# Patient Record
(demographics unavailable — no encounter records)

---

## 2024-10-23 NOTE — HISTORY OF PRESENT ILLNESS
[de-identified] : COUGH, MOM STATES THAT THE PATIENT'S COUGH GETS WORSE AT NIGHT. , 1 WEEK HX OF NIGHT TIME COUGH, RESPONDS TO ALBUTEROL NEBS, NO OTHERN SX

## 2025-03-05 NOTE — HISTORY OF PRESENT ILLNESS
[Father] : father [Grade ___] : Grade [unfilled] [Fruit] : fruit [Vegetables] : vegetables [Meat] : meat [Eggs] : eggs [Fish] : fish [___ stools per day] : [unfilled]  stools per day [Loose] : stools are loose consistency [Brushing teeth twice/d] : brushing teeth twice per day [Yes] : Patient goes to dentist yearly [Toothpaste] : Primary Fluoride Source: Toothpaste [Playtime (60 min/d)] : playtime 60 min a day [< 2 hrs of screen time per day] : less than 2 hrs of screen time per day [Appropiate parent-child-sibling interaction] : appropriate parent-child-sibling interaction [Has Friends] : has friends [No] : No cigarette smoke exposure [Normal] : normal [LMP: _____] : LMP: [unfilled] [Days of Bleeding: _____] : Days of bleeding: [unfilled] [Up to date] : Up to date [NO] : No [Heavy Bleeding] : no heavy bleeding [Painful Cramps] : no painful cramps [Exposure to tobacco] : no exposure to tobacco [Exposure to electronic nicotine delivery system] : No exposure to electronic nicotine delivery system [Exposure to illicit drugs] : no exposure to illicit drugs [de-identified] : Likes chicken and rice.  [de-identified] : No cavities; referred to orthodontist [FreeTextEntry9] : Likes to play [employer], likes to dance from [employer], likes to draw [de-identified] : [employer]. Has friends. Likes [employer]. School is going well.  [FreeTextEntry1] : Only using Flovent as needed for acute illness, last was 2 weeks ago. Not on it currently

## 2025-03-05 NOTE — HISTORY OF PRESENT ILLNESS
[Father] : father [Grade ___] : Grade [unfilled] [Fruit] : fruit [Vegetables] : vegetables [Meat] : meat [Eggs] : eggs [Fish] : fish [___ stools per day] : [unfilled]  stools per day [Loose] : stools are loose consistency [Brushing teeth twice/d] : brushing teeth twice per day [Yes] : Patient goes to dentist yearly [Toothpaste] : Primary Fluoride Source: Toothpaste [Playtime (60 min/d)] : playtime 60 min a day [< 2 hrs of screen time per day] : less than 2 hrs of screen time per day [Appropiate parent-child-sibling interaction] : appropriate parent-child-sibling interaction [Has Friends] : has friends [No] : No cigarette smoke exposure [Normal] : normal [LMP: _____] : LMP: [unfilled] [Days of Bleeding: _____] : Days of bleeding: [unfilled] [Up to date] : Up to date [NO] : No [Heavy Bleeding] : no heavy bleeding [Painful Cramps] : no painful cramps [Exposure to tobacco] : no exposure to tobacco [Exposure to electronic nicotine delivery system] : No exposure to electronic nicotine delivery system [Exposure to illicit drugs] : no exposure to illicit drugs [de-identified] : Likes chicken and rice.  [de-identified] : No cavities; referred to orthodontist [FreeTextEntry9] : Likes to play [employer], likes to dance from [employer], likes to draw [de-identified] : [employer]. Has friends. Likes [employer]. School is going well.  [FreeTextEntry1] : Only using Flovent as needed for acute illness, last was 2 weeks ago. Not on it currently

## 2025-03-05 NOTE — DISCUSSION/SUMMARY
[School] : school [Development and Mental Health] : development and mental health [Nutrition and Physical Activity] : nutrition and physical activity [Oral Health] : oral health [Safety] : safety [Father] : father [Full Activity without restrictions including Physical Education & Athletics] : Full Activity without restrictions including Physical Education & Athletics [FreeTextEntry1] :  9 year old F presenting for [facility]. PE and vitals wnl. Appropriate growth and development.  Plan: - Continue balanced diet with all food groups - Brush teeth twice a day with toothbrush. Recommend visit to dentist - Help child to maintain consistent daily routines and sleep schedule - School discussed - Ensure home is safe. Teach child about personal safety - Use consistent, positive discipline - Limit screen time to no more than 2 hours per day except for schoolwork - Encourage physical activity - Child needs to ride in a belt-positioning booster seat until  4 feet 9 inches has been reached and are between 8 and 12 years of age.   Return 1 year for routine well child check. F/u labs Declined Flu Optho for failed vision screen

## 2025-03-05 NOTE — DISCUSSION/SUMMARY
[School] : school [Development and Mental Health] : development and mental health [Nutrition and Physical Activity] : nutrition and physical activity [Oral Health] : oral health [Safety] : safety [Father] : father [Full Activity without restrictions including Physical Education & Athletics] : Full Activity without restrictions including Physical Education & Athletics [FreeTextEntry1] :  9 year old F presenting for [facility]. PE and vitals wnl. Appropriate growth and development.  Plan: - Continue balanced diet with all food groups - Brush teeth twice a day with toothbrush. Recommend visit to dentist - Help child to maintain consistent daily routines and sleep schedule - School discussed - Ensure home is safe. Teach child about personal safety - Use consistent, positive discipline - Limit screen time to no more than 2 hours per day except for schoolwork - Encourage physical activity - Child needs to ride in a belt-positioning booster seat until  4 feet 9 inches has been reached and are between 8 and 12 years of age.   Return 1 year for routine well child check. F/u labs Declined Flu Optho for failed vision screen